# Patient Record
(demographics unavailable — no encounter records)

---

## 2025-07-23 NOTE — PHYSICAL EXAM
[General Appearance - Alert] : alert [General Appearance - Well Nourished] : well nourished [General Appearance - Well-Appearing] : healthy appearing [FreeTextEntry3] : Vascular Exam: DP Pulse (left): 1/4. DP Pulse (right): 1/4. PT Pulse (left): 1/4. PT Pulse (right): 1/4. Capillary Return - L: Instantaneous. Capillary Return - R: Instantaneous. Temperature Grad - L: Hot to Warm. Temperature Grad - R: Hot to Warm.  [de-identified] : Orthopedic Exam: No structural deformities present.  [FreeTextEntry1] : Neurological Exam: Sharp / Dull - L: WNL. Sharp / Dull - R: WNL. Light Touch/pressure - L: WNL. Light Touch/pressure - R: WNL. Hot/cold - L: WNL. Hot/cold - R: WNL. Vibratory - L: WNL. Vibratory - R: WNL.

## 2025-07-23 NOTE — PROCEDURE
[FreeTextEntry1] : Plan:  Examination.  ([id number]).  We had a lengthy discussion concerning the patient's condition.  Nail biopsy was performed. ([id number]).  Results to be reviewed when available.  After a further discussion the patient requests topical treatment if appropriate. Patient to return: 1 month.

## 2025-07-23 NOTE — HISTORY OF PRESENT ILLNESS
[FreeTextEntry1] : Rip is a 74-year-old male who presents this morning upon referral from his Dermatologist for a fungus infection in his toenails.  The left big toe was hurting.  He managed to cut a piece off it.  He would like to treat this condition.